# Patient Record
Sex: MALE | Race: WHITE | NOT HISPANIC OR LATINO | Employment: OTHER | URBAN - NONMETROPOLITAN AREA
[De-identification: names, ages, dates, MRNs, and addresses within clinical notes are randomized per-mention and may not be internally consistent; named-entity substitution may affect disease eponyms.]

---

## 2018-11-26 ENCOUNTER — OFFICE VISIT (OUTPATIENT)
Dept: OTOLARYNGOLOGY | Facility: CLINIC | Age: 83
End: 2018-11-26

## 2018-11-26 VITALS
HEART RATE: 73 BPM | SYSTOLIC BLOOD PRESSURE: 123 MMHG | TEMPERATURE: 98 F | DIASTOLIC BLOOD PRESSURE: 76 MMHG | WEIGHT: 159 LBS | BODY MASS INDEX: 24.1 KG/M2 | HEIGHT: 68 IN | RESPIRATION RATE: 20 BRPM

## 2018-11-26 DIAGNOSIS — H02.106 ECTROPION DUE TO LAXITY OF EYELID, LEFT: Primary | ICD-10-CM

## 2018-11-26 PROCEDURE — 99203 OFFICE O/P NEW LOW 30 MIN: CPT | Performed by: OTOLARYNGOLOGY

## 2018-11-26 RX ORDER — LATANOPROST 50 UG/ML
SOLUTION/ DROPS OPHTHALMIC
COMMUNITY
Start: 2018-10-18

## 2018-11-26 RX ORDER — OXYCODONE AND ACETAMINOPHEN 10; 325 MG/1; MG/1
TABLET ORAL
COMMUNITY
Start: 2018-07-13

## 2018-11-26 RX ORDER — HYDROXYZINE HYDROCHLORIDE 25 MG/1
TABLET, FILM COATED ORAL
COMMUNITY
Start: 2018-09-06

## 2018-11-26 NOTE — PROGRESS NOTES
PRIMARY CARE PROVIDER: Andre Graham MD  REFERRING PROVIDER: No ref. provider found    Chief Complaint   Patient presents with   • Ptosis     left lower eyelid problems       Subjective   History of Present Illness:  Roel Cordon is a  86 y.o. male reports drooping of the left lower eyelid.  This is been present for 1.5 years.  The amount of drooping it is moderate to severe.  He denies any significant eye irritation or epiphora.  He has had no surgery to the eyelid or cheek.  Nothing makes this better or worse.  He has very poor eye sight at baseline.    Review of Systems:  Review of Systems   Constitutional: Negative for chills and fever.   Eyes: Positive for redness and visual disturbance. Negative for pain and discharge.   Musculoskeletal: Negative for neck pain.   Neurological: Positive for facial asymmetry.   Hematological: Negative for adenopathy. Bruises/bleeds easily.       Past History:  Past Medical History:   Diagnosis Date   • Arthritis    • COPD (chronic obstructive pulmonary disease) (CMS/HCC)    • History of Clostridium difficile    • Retinal disease      History reviewed. No pertinent surgical history.  Family History   Problem Relation Age of Onset   • Esophageal cancer Neg Hx    • Colon polyps Neg Hx    • Colon cancer Neg Hx    • Liver cancer Neg Hx    • Liver disease Neg Hx    • Rectal cancer Neg Hx    • Stomach cancer Neg Hx      Social History     Tobacco Use   • Smoking status: Current Some Day Smoker     Packs/day: 0.50   • Smokeless tobacco: Never Used   Substance Use Topics   • Alcohol use: No   • Drug use: No     Allergies:  Patient has no known allergies.    Current Outpatient Medications:   •  Aclidinium Bromide (TUDORZA PRESSAIR IN), Inhale., Disp: , Rfl:   •  Hydrocodone-Acetaminophen (NORCO PO), Take  by mouth., Disp: , Rfl:   •  hydrOXYzine (ATARAX) 25 MG tablet, , Disp: , Rfl:   •  [employer] [employer], AMB, KIT, Pharmacy Label,, Apply  topically to the appropriate area as directed.,  Disp: , Rfl:   •  latanoprost (XALATAN) 0.005 % ophthalmic solution, , Disp: , Rfl:   •  oxyCODONE-acetaminophen (PERCOCET)  MG per tablet, Take  by mouth., Disp: , Rfl:   •  Pantoprazole Sodium (PROTONIX PO), Take  by mouth., Disp: , Rfl:   •  sucralfate (CARAFATE) 1 G tablet, Take 1 g by mouth., Disp: , Rfl:   •  tamsulosin (FLOMAX) 0.4 MG capsule 24 hr capsule, Take 1 capsule by mouth Every Night., Disp: , Rfl:       Objective     Vital Signs:  Temp:  [98 °F (36.7 °C)] 98 °F (36.7 °C)  Heart Rate:  [73] 73  Resp:  [20] 20  BP: (123)/(76) 123/76    Physical Exam:  Physical Exam   Constitutional: He is oriented to person, place, and time. He appears well-developed and well-nourished. He is cooperative. No distress.   HENT:   Head: Normocephalic and atraumatic.   Right Ear: External ear normal.   Left Ear: External ear normal.   Nose: Nose normal.   Mouth/Throat: Oropharynx is clear and moist.   Eyes: Conjunctivae and EOM are normal. Pupils are equal, round, and reactive to light. Right eye exhibits no discharge. Left eye exhibits no discharge. No scleral icterus.   Left lower eyelid ectropion with conjunctival erythema and thickening.  Simulating a tarsal strip is able to correct the ectropion despite the mucosal thickening.   Neck: Normal range of motion. Neck supple. No JVD present. No tracheal deviation present. No thyromegaly present.   Pulmonary/Chest: Effort normal. No stridor.   Musculoskeletal: Normal range of motion. He exhibits no edema or deformity.   Lymphadenopathy:     He has no cervical adenopathy.   Neurological: He is alert and oriented to person, place, and time. He has normal strength. No cranial nerve deficit. Coordination normal.   Skin: Skin is warm and dry. No rash noted. He is not diaphoretic. No erythema. No pallor.   Psychiatric: He has a normal mood and affect. His speech is normal and behavior is normal. Judgment and thought content normal. Cognition and memory are normal.    Nursing note and vitals reviewed.      Assessment   Assessment:  1. 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  I would send the excised conjunctiva for pathology to exclude squamous cell carcinoma, etc due to the significant asymmetry.  I have also offered referral to oculoplastics - he is not interested in the referral.  RBAC discussed including making the ectropion worse.  He will think about if he wants this performed or not and call my nurse to schedule as an IOP versus in the OR with MAC - he is leaning towards the local procedure.      Return for 1 week postoperatively.    My findings and recommendations were discussed and questions were answered.     Abner Houston MD  11/26/18  12:25 PM

## 2021-06-16 ENCOUNTER — TRANSCRIBE ORDERS (OUTPATIENT)
Dept: LAB | Facility: HOSPITAL | Age: 86
End: 2021-06-16

## 2021-06-16 DIAGNOSIS — Z01.818 PREOPERATIVE TESTING: Primary | ICD-10-CM

## 2021-06-19 ENCOUNTER — LAB (OUTPATIENT)
Dept: LAB | Facility: HOSPITAL | Age: 86
End: 2021-06-19

## 2021-06-19 LAB — SARS-COV-2 ORF1AB RESP QL NAA+PROBE: NOT DETECTED

## 2021-06-19 PROCEDURE — U0005 INFEC AGEN DETEC AMPLI PROBE: HCPCS | Performed by: PAIN MEDICINE

## 2021-06-19 PROCEDURE — C9803 HOPD COVID-19 SPEC COLLECT: HCPCS | Performed by: PAIN MEDICINE

## 2021-06-19 PROCEDURE — U0004 COV-19 TEST NON-CDC HGH THRU: HCPCS | Performed by: PAIN MEDICINE

## 2021-08-26 ENCOUNTER — TRANSCRIBE ORDERS (OUTPATIENT)
Dept: LAB | Facility: HOSPITAL | Age: 86
End: 2021-08-26